# Patient Record
Sex: MALE | Race: BLACK OR AFRICAN AMERICAN | NOT HISPANIC OR LATINO | URBAN - METROPOLITAN AREA
[De-identification: names, ages, dates, MRNs, and addresses within clinical notes are randomized per-mention and may not be internally consistent; named-entity substitution may affect disease eponyms.]

---

## 2017-03-20 ENCOUNTER — HOSPITAL ENCOUNTER (EMERGENCY)
Facility: OTHER | Age: 52
Discharge: HOME | End: 2017-03-20
Attending: EMERGENCY MEDICINE
Payer: COMMERCIAL

## 2017-03-20 VITALS
BODY MASS INDEX: 23.52 KG/M2 | OXYGEN SATURATION: 99 % | RESPIRATION RATE: 16 BRPM | DIASTOLIC BLOOD PRESSURE: 58 MMHG | SYSTOLIC BLOOD PRESSURE: 114 MMHG | TEMPERATURE: 100 F | HEIGHT: 71 IN | WEIGHT: 168 LBS | HEART RATE: 81 BPM

## 2017-03-20 DIAGNOSIS — L50.9 URTICARIA: Primary | ICD-10-CM

## 2017-03-20 PROCEDURE — 99283 EMERGENCY DEPT VISIT LOW MDM: CPT

## 2017-03-20 PROCEDURE — 63600175 PHARM REV CODE 636 W HCPCS: Performed by: PHYSICIAN ASSISTANT

## 2017-03-20 PROCEDURE — 25000003 PHARM REV CODE 250: Performed by: PHYSICIAN ASSISTANT

## 2017-03-20 RX ORDER — FAMOTIDINE 20 MG/1
20 TABLET, FILM COATED ORAL 2 TIMES DAILY PRN
Qty: 20 TABLET | Refills: 0 | Status: SHIPPED | OUTPATIENT
Start: 2017-03-20 | End: 2018-03-20

## 2017-03-20 RX ORDER — DIPHENHYDRAMINE HCL 25 MG
25 CAPSULE ORAL EVERY 6 HOURS PRN
Qty: 20 CAPSULE | Refills: 0 | Status: SHIPPED | OUTPATIENT
Start: 2017-03-20

## 2017-03-20 RX ORDER — PREDNISONE 20 MG/1
60 TABLET ORAL DAILY
Qty: 12 TABLET | Refills: 0 | Status: SHIPPED | OUTPATIENT
Start: 2017-03-20 | End: 2017-03-24

## 2017-03-20 RX ORDER — ASPIRIN 81 MG/1
81 TABLET ORAL DAILY
COMMUNITY

## 2017-03-20 RX ORDER — FAMOTIDINE 20 MG/1
20 TABLET, FILM COATED ORAL
Status: COMPLETED | OUTPATIENT
Start: 2017-03-20 | End: 2017-03-20

## 2017-03-20 RX ORDER — DIPHENHYDRAMINE HCL 25 MG
50 CAPSULE ORAL
Status: COMPLETED | OUTPATIENT
Start: 2017-03-20 | End: 2017-03-20

## 2017-03-20 RX ORDER — PREDNISONE 20 MG/1
60 TABLET ORAL
Status: COMPLETED | OUTPATIENT
Start: 2017-03-20 | End: 2017-03-20

## 2017-03-20 RX ADMIN — DIPHENHYDRAMINE HYDROCHLORIDE 50 MG: 25 CAPSULE ORAL at 11:03

## 2017-03-20 RX ADMIN — FAMOTIDINE 20 MG: 20 TABLET, FILM COATED ORAL at 11:03

## 2017-03-20 RX ADMIN — PREDNISONE 60 MG: 20 TABLET ORAL at 11:03

## 2017-03-20 NOTE — DISCHARGE INSTRUCTIONS
Hives (Adult)  Hives are pink or red bumps on the skin. These bumps are also known as wheals. The bumps can itch, burn, or sting. Hives can occur anywhere on the body. They vary in size and shape and can form in clusters. Individual hives can appear and go away quickly. New hives may develop as old ones fade. Hives are common and usually harmless. Occasionally hives are a sign of a serious allergy.  Hives are often caused by an allergic reaction. It may be an allergic reaction to foods such as fruit, shellfish, chocolate, nuts, or tomatoes. It may be a reaction to pollens, animal fur, or mold spores. Medicines, chemicals, and insect bites can also cause hives. And hives can be caused by hot sun or cold air. The cause of hives can be difficult to find.  You may be given medicines to relieve swelling and itching. Follow all instructions when using these medicines. The hives will usually fade in a few days, but can last up to 2 weeks.  Home care  Follow these tips:  · Try to find the cause of the hives and eliminate it. Discuss possible causes with your healthcare provider. Future reactions to the same allergen may be worse.  · Dont scratch the hives. Scratching will delay healing. To reduce itching, apply cool, wet compresses to the skin.  · Dress in soft, loose cotton clothing.  · Dont bathe in hot water. This can make the itching worse.  · Apply an ice pack or cool pack wrapped in a thin towel to your skin. This will help reduce redness and itching. But if your hives were caused by exposure to cold, then do not apply more cold to them.  · You may use over-the counter antihistamines to reduce itching. Some older antihistamines, such as diphenhydramine and chlorpheniramine, are inexpensive. But they need to be taken often and may make you sleepy. They are best used at bedtime. Dont use diphenhydramine if you have glaucoma or have trouble urinating because of an enlarged prostate. Newer antihistamines, such as  loratadine, cetirizine, and fexofenadine, are generally more expensive. But they tend to have fewer side effects, such as drowsiness. They can be taken less often.  · Another type of antihistamine is used to treat heartburn. This type includes ranitidine, nizatidine, famotidine, and cimetidine. These are sometimes used along with the above antihistamines if a single medicine is not working.  Follow-up care  Follow up with your healthcare provider if your symptoms don't get better in 2 days. Ask your provider about allergy testing if you have had a severe reaction, or have had several episodes of hives. He or she can use the allergy testing to find out what you are allergic to.  When to seek medical advice  Call your healthcare provider right away if any of these occur:  · Fever of 100.4°F (38.0°C) or higher, or as directed by your healthcare provider  · Redness, swelling, or pain  · Foul-smelling fluid coming from the rash  Call 911  Call 911 if any of the following occur:  · Swelling of the face, throat, or tongue  · Trouble breathing or swallowing  · Dizziness, weakness, or fainting  Date Last Reviewed: 9/1/2016  © 4885-9646 [employer]. [address] [place] [address], [place] [address]. All rights reserved. This information is not intended as a substitute for professional medical care. Always follow your healthcare professional's instructions.

## 2017-03-20 NOTE — ED AVS SNAPSHOT
[facility]  [address]  [facility] [address]               Jacky Allison   3/20/2017 10:14 AM   ED    Description:  Male : 1965   Department:  [facility]           Your Care was Coordinated By:     Provider Role From To    Williams Jason MD Attending Provider 17 1017 --    Kathleen Fitzgerald PA-C Physician Assistant 17 1018 --      Reason for Visit     Urticaria           Diagnoses this Visit        Comments    Urticaria    -  Primary       ED Disposition     None           To Do List           Follow-up Information     Follow up with PCP In 2 days.       These Medications        Disp Refills Start End    predniSONE (DELTASONE) 20 MG tablet 12 tablet 0 3/20/2017 3/24/2017    Take 3 tablets (60 mg total) by mouth once daily. Start tomorrow. - Oral    diphenhydrAMINE (BENADRYL) 25 mg capsule 20 capsule 0 3/20/2017     Take 1 each (25 mg total) by mouth every 6 (six) hours as needed for Itching or Allergies. - Oral    famotidine (PEPCID) 20 MG tablet 20 tablet 0 3/20/2017 3/20/2018    Take 1 tablet (20 mg total) by mouth 2 (two) times daily as needed (itching). - Oral      [facility]     [facility] -  Assistance  Registered nurses in the [facility] provide clinical advisement, health education, appointment booking, and other advisory services.  Call for this free service at [phone].             Medications           Message regarding Medications     Verify the changes and/or additions to your medication regime listed below are the same as discussed with your clinician today.  If any of these changes or additions are incorrect, please notify your healthcare provider.        START taking these NEW medications        Refills    predniSONE (DELTASONE) 20 MG tablet 0    Sig: Take 3 tablets (60 mg total) by mouth once daily. Start tomorrow.    Class: Print    Route: Oral     "diphenhydrAMINE (BENADRYL) 25 mg capsule 0    Sig: Take 1 each (25 mg total) by mouth every 6 (six) hours as needed for Itching or Allergies.    Class: Print    Route: Oral    famotidine (PEPCID) 20 MG tablet 0    Sig: Take 1 tablet (20 mg total) by mouth 2 (two) times daily as needed (itching).    Class: Print    Route: Oral      These medications were administered today        Dose Freq    predniSONE tablet 60 mg 60 mg ED 1 Time    Sig: Take 3 tablets (60 mg total) by mouth ED 1 Time.    Class: Normal    Route: Oral    diphenhydrAMINE capsule 50 mg 50 mg ED 1 Time    Sig: Take 2 each (50 mg total) by mouth ED 1 Time.    Class: Normal    Route: Oral    famotidine tablet 20 mg 20 mg ED 1 Time    Sig: Take 1 tablet (20 mg total) by mouth ED 1 Time.    Class: Normal    Route: Oral           Verify that the below list of medications is an accurate representation of the medications you are currently taking.  If none reported, the list may be blank. If incorrect, please contact your healthcare provider. Carry this list with you in case of emergency.           Current Medications     aspirin (ECOTRIN) 81 MG EC tablet Take 81 mg by mouth once daily.    ATORVASTATIN CALCIUM (ATORVASTATIN ORAL) Take by mouth.    diphenhydrAMINE (BENADRYL) 25 mg capsule Take 1 each (25 mg total) by mouth every 6 (six) hours as needed for Itching or Allergies.    diphenhydrAMINE capsule 50 mg Take 2 each (50 mg total) by mouth ED 1 Time.    famotidine (PEPCID) 20 MG tablet Take 1 tablet (20 mg total) by mouth 2 (two) times daily as needed (itching).    famotidine tablet 20 mg Take 1 tablet (20 mg total) by mouth ED 1 Time.    predniSONE (DELTASONE) 20 MG tablet Take 3 tablets (60 mg total) by mouth once daily. Start tomorrow.           Clinical Reference Information           Your Vitals Were     BP Pulse Temp Resp Height Weight    118/65 (BP Location: Left arm, Patient Position: Sitting) 95 99.7 °F (37.6 °C) (Oral) 18 5' 11" (1.803 m) 76.2 kg " (168 lb)    SpO2 BMI             100% 23.43 kg/m2         Allergies as of 3/20/2017     No Known Allergies      Immunizations Administered on Date of Encounter - 3/20/2017     None      ED Micro, Lab, POCT     None      ED Imaging Orders     None        Discharge Instructions         Hives (Adult)  Hives are pink or red bumps on the skin. These bumps are also known as wheals. The bumps can itch, burn, or sting. Hives can occur anywhere on the body. They vary in size and shape and can form in clusters. Individual hives can appear and go away quickly. New hives may develop as old ones fade. Hives are common and usually harmless. Occasionally hives are a sign of a serious allergy.  Hives are often caused by an allergic reaction. It may be an allergic reaction to foods such as fruit, shellfish, chocolate, nuts, or tomatoes. It may be a reaction to pollens, animal fur, or mold spores. Medicines, chemicals, and insect bites can also cause hives. And hives can be caused by hot sun or cold air. The cause of hives can be difficult to find.  You may be given medicines to relieve swelling and itching. Follow all instructions when using these medicines. The hives will usually fade in a few days, but can last up to 2 weeks.  Home care  Follow these tips:  · Try to find the cause of the hives and eliminate it. Discuss possible causes with your healthcare provider. Future reactions to the same allergen may be worse.  · Dont scratch the hives. Scratching will delay healing. To reduce itching, apply cool, wet compresses to the skin.  · Dress in soft, loose cotton clothing.  · Dont bathe in hot water. This can make the itching worse.  · Apply an ice pack or cool pack wrapped in a thin towel to your skin. This will help reduce redness and itching. But if your hives were caused by exposure to cold, then do not apply more cold to them.  · You may use over-the counter antihistamines to reduce itching. Some older antihistamines, such as  diphenhydramine and chlorpheniramine, are inexpensive. But they need to be taken often and may make you sleepy. They are best used at bedtime. Dont use diphenhydramine if you have glaucoma or have trouble urinating because of an enlarged prostate. Newer antihistamines, such as loratadine, cetirizine, and fexofenadine, are generally more expensive. But they tend to have fewer side effects, such as drowsiness. They can be taken less often.  · Another type of antihistamine is used to treat heartburn. This type includes ranitidine, nizatidine, famotidine, and cimetidine. These are sometimes used along with the above antihistamines if a single medicine is not working.  Follow-up care  Follow up with your healthcare provider if your symptoms don't get better in 2 days. Ask your provider about allergy testing if you have had a severe reaction, or have had several episodes of hives. He or she can use the allergy testing to find out what you are allergic to.  When to seek medical advice  Call your healthcare provider right away if any of these occur:  · Fever of 100.4°F (38.0°C) or higher, or as directed by your healthcare provider  · Redness, swelling, or pain  · Foul-smelling fluid coming from the rash  Call 911  Call 911 if any of the following occur:  · Swelling of the face, throat, or tongue  · Trouble breathing or swallowing  · Dizziness, weakness, or fainting  Date Last Reviewed: 9/1/2016  © 2445-4965 [employer]. [address] [place] [address], [place] [address]. All rights reserved. This information is not intended as a substitute for professional medical care. Always follow your healthcare professional's instructions.          [facility] Sign-Up     Activating your [facility] account is as easy as 1-2-3!     1) Visit [email or web address], select Sign Up Now, enter this activation code and your date of birth, then select Next.  [email or web address]  Expires: 5/4/2017 11:34 AM      2) Create a username and password to  use when you visit [facility] in the future and select a security question in case you lose your password and select Next.    3) Enter your e-mail address and click Sign Up!    Additional Information  If you have questions, please e-mail [email or web address] or call [phone] to talk to our [facility] staff. Remember, [facility] is NOT to be used for urgent needs. For medical emergencies, dial 911.         Smoking Cessation     If you would like to quit smoking:   You may be eligible for free services if you are a [place] resident and started smoking cigarettes before September 1, 1988.  Call the Smoking Cessation Trust ([facility]) toll free at (187) 604-1999 or (368) 497-7502.   Call 3-849-QUIT-NOW if you do not meet the above criteria.             [facility] complies with applicable Federal civil rights laws and does not discriminate on the basis of race, color, national origin, age, disability, or sex.        Language Assistance Services     ATTENTION: Language assistance services are available, free of charge. Please call [phone].      ATENCIÓN: Si habla español, tiene a hobson disposición servicios gratuitos de asistencia lingüística. Llame al [phone].     CHÚ Ý: N?u b?n nói Ti?ng Vi?t, có các d?ch v? h? tr? ngôn ng? mi?n phí dành cho b?n. G?i s? [phone].

## 2017-03-20 NOTE — ED PROVIDER NOTES
Encounter Date: 3/20/2017       History     Chief Complaint   Patient presents with    Urticaria     pt states he has generalized hives for several days.      Review of patient's allergies indicates:  No Known Allergies  HPI Comments: Patient is a 51-year-old male with history of hyperlipidemia and hypertension who presents the emergency department with rash to body.  Patient states on Friday he noticed hives to his legs and trunk.  Patient states the rash has spread.  Patient reports extreme pruritis. Patient denies any recent illness, fevers, or chills.  Patient denies any recent change in medications.  Patient denies change in soaps, laundry detergents, lotions, or other body products.  Patient denies any allergic systemic symptoms.  Denies oral cavity swelling, shortness of breath, wheezing, nausea, vomiting.    The history is provided by the patient.     Past Medical History:   Diagnosis Date    Hyperlipidemia     Hypertension     Stroke      Past Surgical History:   Procedure Laterality Date    CEREBRAL EMBOLIZATION       History reviewed. No pertinent family history.  Social History   Substance Use Topics    Smoking status: Former Smoker    Smokeless tobacco: None    Alcohol use No     Review of Systems   Constitutional: Negative for activity change, appetite change, chills, fatigue and fever.   HENT: Negative for congestion, ear discharge, ear pain, hearing loss, mouth sores, postnasal drip, rhinorrhea, sore throat and trouble swallowing.    Respiratory: Negative for cough and shortness of breath.    Cardiovascular: Negative for chest pain.   Gastrointestinal: Negative for abdominal pain, blood in stool, constipation, diarrhea, nausea and vomiting.   Genitourinary: Negative for difficulty urinating, dysuria, flank pain and frequency.   Musculoskeletal: Negative for back pain, neck pain and neck stiffness.   Skin: Positive for rash. Negative for wound.   Neurological: Negative for dizziness, syncope,  speech difficulty, weakness, light-headedness, numbness and headaches.       Physical Exam   Initial Vitals   BP Pulse Resp Temp SpO2   03/20/17 0931 03/20/17 0931 03/20/17 0931 03/20/17 0931 03/20/17 0931   118/65 95 18 99.7 °F (37.6 °C) 100 %     Physical Exam    Nursing note and vitals reviewed.  Constitutional: He appears well-developed and well-nourished. He is not diaphoretic.  Non-toxic appearance. No distress.   HENT:   Head: Normocephalic.   Right Ear: Hearing, tympanic membrane, external ear and ear canal normal.   Left Ear: Hearing, tympanic membrane, external ear and ear canal normal.   Nose: Nose normal.   Mouth/Throat: Uvula is midline, oropharynx is clear and moist and mucous membranes are normal. No trismus in the jaw. No uvula swelling. No oropharyngeal exudate.   Eyes: Conjunctivae are normal. Pupils are equal, round, and reactive to light.   Neck: Normal range of motion.   Cardiovascular: Normal rate and regular rhythm.   Pulmonary/Chest: Breath sounds normal. No respiratory distress. He has no wheezes. He has no rhonchi. He has no rales. He exhibits no tenderness.   Abdominal: Soft. Bowel sounds are normal. He exhibits no distension and no mass. There is no tenderness. There is no rebound and no guarding.   Lymphadenopathy:     He has no cervical adenopathy.   Neurological: He is alert and oriented to person, place, and time.   Skin: Skin is warm and dry.   Diffuse erythematous papules and urticarial lesions to trunk and extremities.   Psychiatric: He has a normal mood and affect.         ED Course   Procedures  Labs Reviewed - No data to display          Medical Decision Making:   Initial Assessment:   Urgent evaluation of a 51-year-old male with history of hypertension and hyperlipidemia who presents to the emergency department with rash.  Patient is afebrile, nontoxic appearing, and hemodynamically stable.  On exam, there are diffuse erythematous papules and urticarial lesions to trunk and  extremities.  No oral cavity swelling.  Lungs are clear to auscultation.  Patient has no other systemic symptoms.  No recent changes in medications.  No recent travel.  No recent illnesses.  No recent changes and soaps, laundry detergents, or other body products.  I am unsure of etiology of hives.  He'll be given burst of steroids, Benadryl, and Pepcid.  He is advised to follow-up with PCP in 24-48 hours for reevaluation.  He is advised to return to the emergency department with any worsening symptoms or concerns.  Other:   I have discussed this case with another health care provider.       <> Summary of the Discussion: Dr. Jason                   ED Course     Clinical Impression:   The encounter diagnosis was Urticaria.          Kathleen Fitzgerald PA-C  03/20/17 1133

## 2017-03-20 NOTE — ED TRIAGE NOTES
Pt reports to ED c/o hives with itching to circumferential torso and upper legs x 2 days; denies any recent change in detergent, lotion, or medications. Denies chest pain, SOB, fevers, chills, N/V/D, difficulty breathing or swallowing.

## 2025-03-19 ENCOUNTER — HOSPITAL ENCOUNTER (OUTPATIENT)
Facility: OTHER | Age: 60
Discharge: HOME | End: 2025-03-21
Attending: INTERNAL MEDICINE | Admitting: HOSPITALIST
Payer: COMMERCIAL

## 2025-03-19 DIAGNOSIS — N18.6 END STAGE RENAL DISEASE: ICD-10-CM

## 2025-03-19 DIAGNOSIS — N18.6 ESRD ON DIALYSIS: Primary | ICD-10-CM

## 2025-03-19 DIAGNOSIS — R07.9 CHEST PAIN: ICD-10-CM

## 2025-03-19 DIAGNOSIS — Z99.2 ESRD ON DIALYSIS: Primary | ICD-10-CM

## 2025-03-19 PROBLEM — E78.5 HYPERLIPIDEMIA: Status: ACTIVE | Noted: 2025-02-12

## 2025-03-19 PROBLEM — I10 HYPERTENSION: Status: ACTIVE | Noted: 2025-02-12

## 2025-03-19 LAB
ALBUMIN SERPL BCP-MCNC: 3 G/DL (ref 3.5–5.2)
ANION GAP SERPL CALC-SCNC: 19 MMOL/L (ref 8–16)
BASOPHILS # BLD AUTO: 0.03 K/UL (ref 0–0.2)
BASOPHILS NFR BLD: 0.4 % (ref 0–1.9)
BUN SERPL-MCNC: 108 MG/DL (ref 6–20)
CALCIUM SERPL-MCNC: 7.8 MG/DL (ref 8.7–10.5)
CHLORIDE SERPL-SCNC: 105 MMOL/L (ref 95–110)
CO2 SERPL-SCNC: 13 MMOL/L (ref 23–29)
CREAT SERPL-MCNC: 21 MG/DL (ref 0.5–1.4)
DIFFERENTIAL METHOD BLD: ABNORMAL
EOSINOPHIL # BLD AUTO: 0.3 K/UL (ref 0–0.5)
EOSINOPHIL NFR BLD: 4 % (ref 0–8)
ERYTHROCYTE [DISTWIDTH] IN BLOOD BY AUTOMATED COUNT: 16.8 % (ref 11.5–14.5)
EST. GFR  (NO RACE VARIABLE): 2 ML/MIN/1.73 M^2
GLUCOSE SERPL-MCNC: 166 MG/DL (ref 70–110)
HCT VFR BLD AUTO: 28.3 % (ref 40–54)
HGB BLD-MCNC: 9 G/DL (ref 14–18)
IMM GRANULOCYTES # BLD AUTO: 0.03 K/UL (ref 0–0.04)
IMM GRANULOCYTES NFR BLD AUTO: 0.4 % (ref 0–0.5)
LYMPHOCYTES # BLD AUTO: 1.2 K/UL (ref 1–4.8)
LYMPHOCYTES NFR BLD: 16.3 % (ref 18–48)
MCH RBC QN AUTO: 26.9 PG (ref 27–31)
MCHC RBC AUTO-ENTMCNC: 31.8 G/DL (ref 32–36)
MCV RBC AUTO: 85 FL (ref 82–98)
MONOCYTES # BLD AUTO: 0.6 K/UL (ref 0.3–1)
MONOCYTES NFR BLD: 8.3 % (ref 4–15)
NEUTROPHILS # BLD AUTO: 5.1 K/UL (ref 1.8–7.7)
NEUTROPHILS NFR BLD: 70.6 % (ref 38–73)
NRBC BLD-RTO: 0 /100 WBC
PHOSPHATE SERPL-MCNC: 10.7 MG/DL (ref 2.7–4.5)
PLATELET # BLD AUTO: 106 K/UL (ref 150–450)
PMV BLD AUTO: 9.9 FL (ref 9.2–12.9)
POTASSIUM SERPL-SCNC: 5.2 MMOL/L (ref 3.5–5.1)
RBC # BLD AUTO: 3.35 M/UL (ref 4.6–6.2)
SODIUM SERPL-SCNC: 137 MMOL/L (ref 136–145)
WBC # BLD AUTO: 7.24 K/UL (ref 3.9–12.7)

## 2025-03-19 PROCEDURE — 36558 INSERT TUNNELED CV CATH: CPT | Mod: RT | Performed by: INTERNAL MEDICINE

## 2025-03-19 PROCEDURE — C1894 INTRO/SHEATH, NON-LASER: HCPCS | Performed by: INTERNAL MEDICINE

## 2025-03-19 PROCEDURE — C1769 GUIDE WIRE: HCPCS | Performed by: INTERNAL MEDICINE

## 2025-03-19 PROCEDURE — 63600175 PHARM REV CODE 636 W HCPCS: Mod: TB | Performed by: INTERNAL MEDICINE

## 2025-03-19 PROCEDURE — C1750 CATH, HEMODIALYSIS,LONG-TERM: HCPCS | Performed by: INTERNAL MEDICINE

## 2025-03-19 PROCEDURE — G0378 HOSPITAL OBSERVATION PER HR: HCPCS

## 2025-03-19 PROCEDURE — 80069 RENAL FUNCTION PANEL: CPT | Performed by: INTERNAL MEDICINE

## 2025-03-19 PROCEDURE — 25000003 PHARM REV CODE 250: Performed by: INTERNAL MEDICINE

## 2025-03-19 PROCEDURE — 85025 COMPLETE CBC W/AUTO DIFF WBC: CPT | Performed by: INTERNAL MEDICINE

## 2025-03-19 PROCEDURE — C1751 CATH, INF, PER/CENT/MIDLINE: HCPCS

## 2025-03-19 DEVICE — PRECISION CHRONIC CATHETER KIT,SYMMETRICAL TIP, TAL VENATRAC STYLET,14.5 FR/CH (4.8 MM) X 19 CM
Type: IMPLANTABLE DEVICE | Site: CHEST  WALL | Status: FUNCTIONAL
Brand: PALINDROME

## 2025-03-19 RX ORDER — NIFEDIPINE 30 MG/1
60 TABLET, EXTENDED RELEASE ORAL ONCE
Status: COMPLETED | OUTPATIENT
Start: 2025-03-19 | End: 2025-03-19

## 2025-03-19 RX ORDER — ACETAMINOPHEN 325 MG/1
650 TABLET ORAL EVERY 8 HOURS PRN
Status: DISCONTINUED | OUTPATIENT
Start: 2025-03-19 | End: 2025-03-21 | Stop reason: HOSPADM

## 2025-03-19 RX ORDER — CALCITRIOL 0.25 UG/1
0.5 CAPSULE ORAL DAILY
Status: DISCONTINUED | OUTPATIENT
Start: 2025-03-20 | End: 2025-03-21 | Stop reason: HOSPADM

## 2025-03-19 RX ORDER — HEPARIN SOD,PORCINE/0.9 % NACL 1000/500ML
INTRAVENOUS SOLUTION INTRAVENOUS
Status: DISCONTINUED | OUTPATIENT
Start: 2025-03-19 | End: 2025-03-19

## 2025-03-19 RX ORDER — HEPARIN SODIUM 1000 [USP'U]/ML
INJECTION, SOLUTION INTRAVENOUS; SUBCUTANEOUS
Status: DISCONTINUED | OUTPATIENT
Start: 2025-03-19 | End: 2025-03-19

## 2025-03-19 RX ORDER — CALCIUM CARBONATE 500(1250)
1250 TABLET ORAL
COMMUNITY
Start: 2025-02-20 | End: 2026-02-20

## 2025-03-19 RX ORDER — ONDANSETRON HYDROCHLORIDE 2 MG/ML
4 INJECTION, SOLUTION INTRAVENOUS EVERY 6 HOURS PRN
Status: DISCONTINUED | OUTPATIENT
Start: 2025-03-19 | End: 2025-03-21 | Stop reason: HOSPADM

## 2025-03-19 RX ORDER — SODIUM CHLORIDE 0.9 % (FLUSH) 0.9 %
10 SYRINGE (ML) INJECTION EVERY 8 HOURS PRN
Status: DISCONTINUED | OUTPATIENT
Start: 2025-03-19 | End: 2025-03-21 | Stop reason: HOSPADM

## 2025-03-19 RX ORDER — SODIUM CHLORIDE 0.9 % (FLUSH) 0.9 %
10 SYRINGE (ML) INJECTION EVERY 8 HOURS PRN
Status: DISCONTINUED | OUTPATIENT
Start: 2025-03-19 | End: 2025-03-19

## 2025-03-19 RX ORDER — NIFEDIPINE 30 MG/1
60 TABLET, EXTENDED RELEASE ORAL DAILY
Status: DISCONTINUED | OUTPATIENT
Start: 2025-03-20 | End: 2025-03-21 | Stop reason: HOSPADM

## 2025-03-19 RX ORDER — AMOXICILLIN 250 MG
1 CAPSULE ORAL 2 TIMES DAILY
Status: DISCONTINUED | OUTPATIENT
Start: 2025-03-19 | End: 2025-03-19

## 2025-03-19 RX ORDER — NIFEDIPINE 60 MG/1
1 TABLET, EXTENDED RELEASE ORAL DAILY
COMMUNITY
Start: 2025-02-21 | End: 2026-02-21

## 2025-03-19 RX ORDER — NALOXONE HCL 0.4 MG/ML
0.02 VIAL (ML) INJECTION
Status: DISCONTINUED | OUTPATIENT
Start: 2025-03-19 | End: 2025-03-21 | Stop reason: HOSPADM

## 2025-03-19 RX ORDER — TALC
6 POWDER (GRAM) TOPICAL NIGHTLY PRN
Status: DISCONTINUED | OUTPATIENT
Start: 2025-03-19 | End: 2025-03-21 | Stop reason: HOSPADM

## 2025-03-19 RX ORDER — FAMOTIDINE 20 MG/1
20 TABLET, FILM COATED ORAL
Status: DISCONTINUED | OUTPATIENT
Start: 2025-03-19 | End: 2025-03-21 | Stop reason: HOSPADM

## 2025-03-19 RX ORDER — DESMOPRESSIN ACETATE 4 UG/ML
2 INJECTION, SOLUTION INTRAVENOUS; SUBCUTANEOUS ONCE
Status: COMPLETED | OUTPATIENT
Start: 2025-03-19 | End: 2025-03-19

## 2025-03-19 RX ORDER — SEVELAMER CARBONATE 800 MG/1
1600 TABLET, FILM COATED ORAL
COMMUNITY
Start: 2025-02-20 | End: 2026-02-20

## 2025-03-19 RX ORDER — TALC
6 POWDER (GRAM) TOPICAL NIGHTLY PRN
Status: DISCONTINUED | OUTPATIENT
Start: 2025-03-19 | End: 2025-03-19

## 2025-03-19 RX ORDER — ATORVASTATIN CALCIUM 20 MG/1
80 TABLET, FILM COATED ORAL DAILY
Status: DISCONTINUED | OUTPATIENT
Start: 2025-03-20 | End: 2025-03-21 | Stop reason: HOSPADM

## 2025-03-19 RX ORDER — SEVELAMER CARBONATE 800 MG/1
1600 TABLET, FILM COATED ORAL
Status: DISCONTINUED | OUTPATIENT
Start: 2025-03-20 | End: 2025-03-21 | Stop reason: HOSPADM

## 2025-03-19 RX ORDER — LIDOCAINE HYDROCHLORIDE 20 MG/ML
INJECTION, SOLUTION EPIDURAL; INFILTRATION; INTRACAUDAL; PERINEURAL
Status: DISCONTINUED | OUTPATIENT
Start: 2025-03-19 | End: 2025-03-19

## 2025-03-19 RX ORDER — CALCITRIOL 0.5 UG/1
1 CAPSULE ORAL DAILY
COMMUNITY
Start: 2025-02-21 | End: 2026-02-21

## 2025-03-19 RX ORDER — LABETALOL 200 MG/1
200 TABLET, FILM COATED ORAL ONCE
Status: COMPLETED | OUTPATIENT
Start: 2025-03-19 | End: 2025-03-19

## 2025-03-19 RX ORDER — CALCIUM CARBONATE 200(500)MG
1000 TABLET,CHEWABLE ORAL DAILY
Status: DISCONTINUED | OUTPATIENT
Start: 2025-03-20 | End: 2025-03-21 | Stop reason: HOSPADM

## 2025-03-19 RX ADMIN — LABETALOL HYDROCHLORIDE 200 MG: 200 TABLET, FILM COATED ORAL at 11:03

## 2025-03-19 RX ADMIN — DESMOPRESSIN ACETATE 2 MCG: 4 SOLUTION INTRAVENOUS at 02:03

## 2025-03-19 RX ADMIN — DESMOPRESSIN ACETATE 26.32 MCG: 4 SOLUTION INTRAVENOUS at 05:03

## 2025-03-19 RX ADMIN — NIFEDIPINE 60 MG: 30 TABLET, FILM COATED, EXTENDED RELEASE ORAL at 11:03

## 2025-03-19 NOTE — H&P
[facility] - Cath Lab  History & Physical    Subjective:      Chief Complaint/Reason for Admission: Here for tunneled dialysis catheter placement.    Jacky Allison is a 59 y.o. male with ESRD (now transitioning to hemodialysis at [facility] under my care) who presents today for tunneled dialysis catheter placement because he no longer wishes to do PD.    Past Medical History:   Diagnosis Date    ESRD (end stage renal disease) on dialysis     Hyperlipidemia     Hypertension     Stroke      Past Surgical History:   Procedure Laterality Date    CEREBRAL EMBOLIZATION       Social History[1]    PTA Medications   Medication Sig    aspirin (ECOTRIN) 81 MG EC tablet Take 81 mg by mouth once daily.    ATORVASTATIN CALCIUM (ATORVASTATIN ORAL) Take by mouth.    calcitRIOL (ROCALTROL) 0.5 MCG Cap Take 1 capsule by mouth once daily.    calcium carbonate (OS-ANUJ) 500 mg calcium (1,250 mg) tablet Take 1,250 mg by mouth.    NIFEdipine (PROCARDIA-XL) 60 MG (OSM) 24 hr tablet Take 1 tablet by mouth once daily.    sevelamer carbonate (RENVELA) 800 mg Tab Take 1,600 mg by mouth.    diphenhydrAMINE (BENADRYL) 25 mg capsule Take 1 each (25 mg total) by mouth every 6 (six) hours as needed for Itching or Allergies.    famotidine (PEPCID) 20 MG tablet Take 1 tablet (20 mg total) by mouth 2 (two) times daily as needed (itching).     Review of patient's allergies indicates:  No Known Allergies     Review of Systems   Constitutional: Negative.    Respiratory: Negative.     Cardiovascular: Negative.        Objective:      Vital Signs (Most Recent)  Temp: 98.1 °F (36.7 °C) (03/19/25 1056)  Pulse: 79 (03/19/25 1056)  Resp: 18 (03/19/25 1056)  BP: (!) 177/105 (03/19/25 1212)  SpO2: 100 % (03/19/25 1056)    Vital Signs Range (Last 24H):  Temp:  [98.1 °F (36.7 °C)]   Pulse:  [79]   Resp:  [18]   BP: (177-202)/()   SpO2:  [100 %]     Physical Exam  Constitutional:       General: He is not in acute distress.     Appearance: He is  well-developed. He is not diaphoretic.   HENT:      Head: Normocephalic and atraumatic.   Cardiovascular:      Comments: R IJ open with good evidence of doppler flow  Pulmonary:      Effort: Pulmonary effort is normal. No respiratory distress.   Musculoskeletal:      Cervical back: Neck supple.   Skin:     General: Skin is warm and dry.   Neurological:      Mental Status: He is alert and oriented to person, place, and time.   Psychiatric:         Behavior: Behavior normal.       Assessment:      There are no hospital problems to display for this patient.      Plan:      TDC placement today for hemodialysis.  Risks explained, consent signed.           [1]   Social History  Tobacco Use    Smoking status: Former   Substance Use Topics    Alcohol use: No

## 2025-03-19 NOTE — PLAN OF CARE
Patient brought to holding area. Catheter insertion site noted to be bleeding. Dressing removed and pressure held by RN. MD notified.

## 2025-03-19 NOTE — PLAN OF CARE
Dr. Noguera notified that patient's insertion site is still actively bleeding. MD stated to re-apply pressure to site and administer another dose of DDAVP. Will continue to monitor.

## 2025-03-19 NOTE — PLAN OF CARE
Assessed patient's insertion site. Both dressings saturated and actively bleeding. Manual pressure held for 30 minutes until hemostasis was met. Attempted to contact Dr. Noguera 4 times without any success. House supervisor notified of current situation. While pressure was being held, JENNIFER Le NP arrived in CATH Lab to assess the patient. Both dressings changed and incision site assessed by JENNIFER Le NP. She agreed that hemostasis was met and informed the patient that she will continue to monitor his situation.

## 2025-03-19 NOTE — BRIEF OP NOTE
[facility] - Cath Lab  Brief Operative Note    Surgery Date: 3/19/2025     Surgeons and Role:     * Glenn Noguera MD - Primary    Assisting Surgeon: None    Pre-op Diagnosis:  End stage renal disease [N18.6]    Post-op Diagnosis:  Post-Op Diagnosis Codes:     * End stage renal disease [N18.6]    Procedure(s) (LRB):  Insertion, Catheter, Central Venous, Hemodialysis (N/A)    Anesthesia: 1% lidocaine    Operative Findings: Patient was prepped and draped in a sterile fashion.  This was a successful placement of a R internal jugular vein tunneled dialysis catheter.  Patient tolerated the procedure well and no immediate complications noted.  There was persistent oozing from the venotomy site and his exit site.  He was given a dose of ddavp with some improvement.  He required over 2 hours of compression to stop the oozing.      Estimated Blood Loss: 20 mL         Specimens:   Specimen (24h ago, onward)      None            * No specimens in log *        Discharge Note    OUTCOME: Patient was prepped and draped in a sterile fashion.  This was a successful placement of a R internal jugular vein tunneled dialysis catheter.  Patient tolerated the procedure well and no immediate complications noted.  There was persistent oozing from the venotomy site and his exit site.  He was given a dose of ddavp with some improvement.  He required over 2 hours of compression to stop the oozing.      DISPOSITION: Home or Self Care    FINAL DIAGNOSIS:  <principal problem not specified>    FOLLOWUP: In clinic as needed    DISCHARGE INSTRUCTIONS:    Discharge Procedure Orders   Lifting restrictions   Order Comments: No heavy lifting for 24 hours.     Call MD for:  temperature >100.4     Call MD for:  severe uncontrolled pain     Call MD for:  redness, tenderness, or signs of infection (pain, swelling, redness, odor or green/yellow discharge around incision site)     Call MD for:   Order Comments: Bleeding from the access site.     Activity as  tolerated

## 2025-03-19 NOTE — PROGRESS NOTES
03/19/25 1055 03/19/25 1056   Vital Signs   BP (!) 202/98 (!) 199/111   MAP (mmHg) 142 147   BP Location Left arm Right arm      aware. See new orders. Pt denies SOB, headache, chest pain and visual disturbances at this time.

## 2025-03-19 NOTE — PLAN OF CARE
MD at bedside reassessing insertion site. Small amount of bleeding continued to be noted. MD discussing admission to the hospital with the patient to monitor the site overnight. Patient understands and agrees. Will consult hospitalist for admit orders.

## 2025-03-19 NOTE — PROCEDURES
[facility]     Date of Procedure:  03/19/2025 4:02 PM    Procedure: Tunneled Dialysis Central Catheter Insertion     Indication: ESRD requiring RRT, no longer wishing to do PD     Primary Surgeon: Glenn Noguera MD   Assist: none    Referring Provider: Herberth Snowden     Blood Loss: 20 mL    Procedure in Detail:   Patient was prepped and draped in usual sterile fashion. 1% lidocaine was used for local sedation to anesthetize soft tissues. Ultrasound was used to localize the right internal jugular vein; using ultrasound guidance the right IJ was cannulated. Micropuncture wire passed easily into vein and confirmed in correct location under flouroscopy.  Microsheath passed over micropuncture wire, microwire removed, and glidewire passed into the IVC. This was done under fluoroscopic guidance. Using sequential dilator under flouroscopy, the venotomy site was dilated, then a breakaway sheath was inserted into the vein and was stabilized there while we created the tunnel.  The tunnel tract was then created; mapped in location in the right chest wall; injected prior with 1% lidocaine; then a 19 cm catheter was advanced through the tunnel with a hafsa, passed through the venotomy, and then was advanced into the breakaway sheath using the glidewire.  The breakaway sheath was then broken as the catheter was further advanced through the sheath.  This was done under fluoroscopy.  Both ports flushed and aspirated well.  The catheter was packed with 1000 units/mL heparin. Vicryl suture was used to close the venotomy site, and a #2 ethilon suture was used to secure the catheter at the exit site and to anchor to the patient's skin.     A total of 4 sutures were placed.    Patient tolerated the procedure well. He had significant oozing, requiring over 2 hours of compression (likely related to uremic platelets), so a dose of ddavp was given SQ, surgicell was used without success, dermabond was used and  finally an additional suture was used at the venotomy site.  After two hours the oozing had stopped and the patient was discharged home.  He was given instructions to go to the ER if he starts oozing again and he holds compression and is unable to stop the bleed.      The patient continues to ooze from the venotomy and exit site, so we will plan to admit him overnight to observe for bleeding and then dialyze him in the morning prior to discharge.      Glenn Noguera MD  [phone]

## 2025-03-19 NOTE — PLAN OF CARE
MD at bedside evaluating insertion site. Pressure removed and a small amount of new drainage was noted. MD stated that he will re-evaluate drainage in about 5 minutes to see if it has stopped.

## 2025-03-19 NOTE — Clinical Note
MD notified site still bleeding. MD notified there is no CBC on this patient in epic. Orders to continue to hold pressure.

## 2025-03-20 PROBLEM — D62 ACUTE BLOOD LOSS ANEMIA: Status: ACTIVE | Noted: 2025-03-20

## 2025-03-20 LAB
ALBUMIN SERPL BCP-MCNC: 2.7 G/DL (ref 3.5–5.2)
ALP SERPL-CCNC: 57 U/L (ref 40–150)
ALT SERPL W/O P-5'-P-CCNC: 19 U/L (ref 10–44)
ANION GAP SERPL CALC-SCNC: 16 MMOL/L (ref 8–16)
AST SERPL-CCNC: 17 U/L (ref 10–40)
BASOPHILS # BLD AUTO: 0.03 K/UL (ref 0–0.2)
BASOPHILS NFR BLD: 0.5 % (ref 0–1.9)
BILIRUB SERPL-MCNC: 0.4 MG/DL (ref 0.1–1)
BUN SERPL-MCNC: 116 MG/DL (ref 6–20)
CALCIUM SERPL-MCNC: 7.4 MG/DL (ref 8.7–10.5)
CHLORIDE SERPL-SCNC: 108 MMOL/L (ref 95–110)
CO2 SERPL-SCNC: 16 MMOL/L (ref 23–29)
CREAT SERPL-MCNC: 21.7 MG/DL (ref 0.5–1.4)
DIFFERENTIAL METHOD BLD: ABNORMAL
EOSINOPHIL # BLD AUTO: 0.2 K/UL (ref 0–0.5)
EOSINOPHIL NFR BLD: 3.9 % (ref 0–8)
ERYTHROCYTE [DISTWIDTH] IN BLOOD BY AUTOMATED COUNT: 16.5 % (ref 11.5–14.5)
EST. GFR  (NO RACE VARIABLE): 2 ML/MIN/1.73 M^2
GLUCOSE SERPL-MCNC: 128 MG/DL (ref 70–110)
HCT VFR BLD AUTO: 24.4 % (ref 40–54)
HGB BLD-MCNC: 7.6 G/DL (ref 14–18)
IMM GRANULOCYTES # BLD AUTO: 0.03 K/UL (ref 0–0.04)
IMM GRANULOCYTES NFR BLD AUTO: 0.5 % (ref 0–0.5)
LYMPHOCYTES # BLD AUTO: 1.1 K/UL (ref 1–4.8)
LYMPHOCYTES NFR BLD: 17 % (ref 18–48)
MCH RBC QN AUTO: 26.3 PG (ref 27–31)
MCHC RBC AUTO-ENTMCNC: 31.1 G/DL (ref 32–36)
MCV RBC AUTO: 84 FL (ref 82–98)
MONOCYTES # BLD AUTO: 0.6 K/UL (ref 0.3–1)
MONOCYTES NFR BLD: 10.4 % (ref 4–15)
NEUTROPHILS # BLD AUTO: 4.2 K/UL (ref 1.8–7.7)
NEUTROPHILS NFR BLD: 67.7 % (ref 38–73)
NRBC BLD-RTO: 0 /100 WBC
PLATELET # BLD AUTO: 85 K/UL (ref 150–450)
PMV BLD AUTO: 9.4 FL (ref 9.2–12.9)
POTASSIUM SERPL-SCNC: 5.8 MMOL/L (ref 3.5–5.1)
PROT SERPL-MCNC: 6.4 G/DL (ref 6–8.4)
RBC # BLD AUTO: 2.89 M/UL (ref 4.6–6.2)
SODIUM SERPL-SCNC: 140 MMOL/L (ref 136–145)
WBC # BLD AUTO: 6.17 K/UL (ref 3.9–12.7)

## 2025-03-20 PROCEDURE — 25000003 PHARM REV CODE 250: Performed by: NURSE PRACTITIONER

## 2025-03-20 PROCEDURE — 36415 COLL VENOUS BLD VENIPUNCTURE: CPT | Performed by: NURSE PRACTITIONER

## 2025-03-20 PROCEDURE — G0378 HOSPITAL OBSERVATION PER HR: HCPCS

## 2025-03-20 PROCEDURE — C1751 CATH, INF, PER/CENT/MIDLINE: HCPCS

## 2025-03-20 PROCEDURE — 85025 COMPLETE CBC W/AUTO DIFF WBC: CPT | Performed by: NURSE PRACTITIONER

## 2025-03-20 PROCEDURE — 80053 COMPREHEN METABOLIC PANEL: CPT | Performed by: NURSE PRACTITIONER

## 2025-03-20 RX ORDER — ASPIRIN 81 MG/1
81 TABLET ORAL DAILY
Start: 2025-03-22

## 2025-03-20 RX ORDER — SODIUM CHLORIDE 9 MG/ML
INJECTION, SOLUTION INTRAVENOUS ONCE
Status: CANCELLED | OUTPATIENT
Start: 2025-03-20 | End: 2025-03-20

## 2025-03-20 RX ADMIN — NIFEDIPINE 60 MG: 30 TABLET, FILM COATED, EXTENDED RELEASE ORAL at 08:03

## 2025-03-20 RX ADMIN — ATORVASTATIN CALCIUM 80 MG: 20 TABLET, FILM COATED ORAL at 08:03

## 2025-03-20 RX ADMIN — SEVELAMER CARBONATE 1600 MG: 800 TABLET, FILM COATED ORAL at 12:03

## 2025-03-20 RX ADMIN — SEVELAMER CARBONATE 1600 MG: 800 TABLET, FILM COATED ORAL at 08:03

## 2025-03-20 RX ADMIN — CALCIUM CARBONATE 1000 MG: 500 TABLET, CHEWABLE ORAL at 08:03

## 2025-03-20 RX ADMIN — CALCITRIOL CAPSULES 0.25 MCG 0.5 MCG: 0.25 CAPSULE ORAL at 08:03

## 2025-03-20 NOTE — ASSESSMENT & PLAN NOTE
Patient with ESRD who has been on PD and recently decided to switch to ESRD.  Tunneled catheter placed today 3/19/25, with Dr Noguera and developed significant oozing at insertion site.  Bleeding became controlled after 2 doses of desmopressin.    -nephrology consulted with plan to dialyze in the morning  -monitor CBC and consider transfusion for hemoglobin less than 7  -monitor renal function

## 2025-03-20 NOTE — PLAN OF CARE
critical lab for phosphorus reported and charted by cath lab nurse, plan is for patient to receive dialysis to resolve phosphorus level.     Problem: Adult Inpatient Plan of Care  Goal: Plan of Care Review  Outcome: Progressing  Flowsheets (Taken 3/20/2025 0500)  Plan of Care Reviewed With: patient  Goal: Optimal Comfort and Wellbeing  Outcome: Progressing  Intervention: Monitor Pain and Promote Comfort  Flowsheets (Taken 3/20/2025 0500)  Pain Management Interventions:   relaxation techniques promoted   quiet environment facilitated   pain management plan reviewed with patient/caregiver  Goal: Readiness for Transition of Care  Outcome: Progressing

## 2025-03-20 NOTE — ASSESSMENT & PLAN NOTE
Anemia is likely due to acute blood loss which was from HD cath placement. Most recent hemoglobin and hematocrit are listed below.  Recent Labs     03/19/25  1646 03/20/25  0504   HGB 9.0* 7.6*   HCT 28.3* 24.4*     - received desmopressin x2 and bleeding slowed down.   - Nephrology to give a dose of Epo with HD today and then nephrology notes he may go home.     Plan  - Monitor serial CBC: Daily  - Transfuse PRBC if patient becomes hemodynamically unstable, symptomatic or H/H drops below 7/21.  - Patient has not received any PRBC transfusions to date  - Patient's anemia is currently being monitored and transfuse PRN  - follow up labs.   - nephrology following.

## 2025-03-20 NOTE — SUBJECTIVE & OBJECTIVE
Past Medical History:   Diagnosis Date    ESRD (end stage renal disease) on dialysis     Hyperlipidemia     Hypertension     Stroke        Past Surgical History:   Procedure Laterality Date    CEREBRAL EMBOLIZATION         Review of patient's allergies indicates:  No Known Allergies    No current facility-administered medications on file prior to encounter.     Current Outpatient Medications on File Prior to Encounter   Medication Sig    aspirin (ECOTRIN) 81 MG EC tablet Take 81 mg by mouth once daily.    ATORVASTATIN CALCIUM (ATORVASTATIN ORAL) Take by mouth.    calcitRIOL (ROCALTROL) 0.5 MCG Cap Take 1 capsule by mouth once daily.    calcium carbonate (OS-ANUJ) 500 mg calcium (1,250 mg) tablet Take 1,250 mg by mouth.    NIFEdipine (PROCARDIA-XL) 60 MG (OSM) 24 hr tablet Take 1 tablet by mouth once daily.    sevelamer carbonate (RENVELA) 800 mg Tab Take 1,600 mg by mouth.    diphenhydrAMINE (BENADRYL) 25 mg capsule Take 1 each (25 mg total) by mouth every 6 (six) hours as needed for Itching or Allergies.    famotidine (PEPCID) 20 MG tablet Take 1 tablet (20 mg total) by mouth 2 (two) times daily as needed (itching).     Family History    None       Tobacco Use    Smoking status: Former    Smokeless tobacco: Not on file   Substance and Sexual Activity    Alcohol use: No    Drug use: Not on file    Sexual activity: Not on file     Review of Systems   Constitutional:  Negative for activity change, appetite change, chills and fever.   HENT:  Negative for congestion, sore throat and trouble swallowing.    Eyes:  Negative for photophobia and visual disturbance.   Respiratory:  Negative for cough, chest tightness and shortness of breath.    Cardiovascular:  Negative for chest pain, palpitations and leg swelling.   Gastrointestinal:  Negative for abdominal pain, diarrhea and nausea.   Genitourinary:  Negative for dysuria, flank pain and hematuria.   Musculoskeletal:  Negative for back pain.   Neurological:  Negative for  dizziness, weakness and headaches.   Psychiatric/Behavioral:  Negative for confusion.      Objective:     Vital Signs (Most Recent):  Temp: 97.5 °F (36.4 °C) (03/19/25 1925)  Pulse: 85 (03/19/25 2053)  Resp: 18 (03/19/25 1925)  BP: (!) 178/88 (03/19/25 1925)  SpO2: 99 % (03/19/25 1925) Vital Signs (24h Range):  Temp:  [97.5 °F (36.4 °C)-98.1 °F (36.7 °C)] 97.5 °F (36.4 °C)  Pulse:  [76-90] 85  Resp:  [16-18] 18  SpO2:  [99 %-100 %] 99 %  BP: (139-202)/() 178/88     Weight: 65.3 kg (143 lb 15.4 oz)  Body mass index is 20.08 kg/m².     Physical Exam  Vitals reviewed.   Constitutional:       Appearance: Normal appearance. He is normal weight.   HENT:      Head: Normocephalic.      Mouth/Throat:      Mouth: Mucous membranes are moist.      Pharynx: Oropharynx is clear.   Eyes:      General: Lids are normal. Gaze aligned appropriately.      Conjunctiva/sclera: Conjunctivae normal.   Cardiovascular:      Rate and Rhythm: Normal rate and regular rhythm.      Pulses: Normal pulses.      Heart sounds: Normal heart sounds.      Comments: Right upper chest dialysis catheter bleeding controlled near insertion site  Pulmonary:      Effort: Pulmonary effort is normal.      Breath sounds: Normal breath sounds.   Abdominal:      General: Bowel sounds are normal.      Palpations: Abdomen is soft.      Comments: PD catheter present   Musculoskeletal:         General: Normal range of motion.      Cervical back: Normal range of motion.   Skin:     General: Skin is warm and dry.   Neurological:      Mental Status: He is alert and oriented to person, place, and time. Mental status is at baseline.   Psychiatric:         Mood and Affect: Mood normal.                Significant Labs: All pertinent labs within the past 24 hours have been reviewed.  CBC:   Recent Labs   Lab 03/19/25  1646   WBC 7.24   HGB 9.0*   HCT 28.3*   *     CMP:   Recent Labs   Lab 03/19/25  1643      K 5.2*      CO2 13*   *   *    CREATININE 21.0*   CALCIUM 7.8*   ALBUMIN 3.0*   ANIONGAP 19*       Significant Imaging: I have reviewed all pertinent imaging results/findings within the past 24 hours.

## 2025-03-20 NOTE — ASSESSMENT & PLAN NOTE
Patient's blood pressure range in the last 24 hours was: BP  Min: 139/82  Max: 202/98.The patient's inpatient anti-hypertensive regimen is listed below:  Current Antihypertensives  , Daily, Oral  NIFEdipine 24 hr tablet 60 mg, Daily, Oral    Plan  - BP is controlled, no changes needed to their regimen  -

## 2025-03-20 NOTE — PROGRESS NOTES
[facility] Surg ([address] Medicine  Progress Note    Patient Name: Jacky Allison  MRN: [id number]  Patient Class: OP- Observation   Admission Date: 3/19/2025  Length of Stay: 0 days  Attending Physician: Suraj Sutherland MD  Primary Care Provider: No primary care provider on file.        Subjective     Principal Problem:ESRD on dialysis        HPI:  Jacky Allison is a 59 year old male with a past medical history of ESRD, hyperlipidemia, hypertension and stroke who presents today for tunneled dialysis catheter placement because he no longer wishes to do PD.  Patient is followed by , nephrology, outpatient.  Patient underwent procedure and developed uncontrolled bleeding at insertion site postoperatively.  Patient received desmopressin x2 and bleeding slowed down.  CBC showed hemoglobin 9, hematocrit 20 platelets 106.  Patient was referred to Hospital Medicine postoperatively.  Nephrology consulted with plan to dialyze in the morning.  Patient will be placed in observation for further and evaluation management    Overview/Hospital Course:  [facility] Interventional Nephrology Service   Date of Procedure:  03/19/2025 4:02 PM  Procedure: Tunneled Dialysis Central Catheter Insertion     Had bleeding post cath placement and admitted for observation.  Hgb 9.0 to 7.6.  Nephrology following.     Patient reports doing well and is anxious to go home after he completes dialysis.  Discussed with Dr. Noguera in Nephrology and will get Epo dose on HD and patient is okay to go home after dialysis.  Nephrology states his dialysis chair is set.  Discussed with patient expected courses to do well and returned to hospital if feeling bad.  All questions answered to patient's satisfaction and he is in agreement with plan.    Interval History: Pt new to me.  Doing okay and states he is ready to go home after dialysis..  Discussed with Dr. Noguera we will give patient a dose of erythropoietin on HD today, and then patient  may be discharged home after dialysis as he has a dialysis chair arranged already.    Review of Systems   Constitutional:  Negative for appetite change and fever.   Respiratory:  Negative for cough and shortness of breath.    Cardiovascular:  Negative for chest pain and palpitations.   Gastrointestinal:  Negative for abdominal pain and nausea.   Skin:  Negative for color change.   Neurological:  Negative for headaches.   Psychiatric/Behavioral:  Negative for agitation.      Objective:     Vital Signs (Most Recent):  Temp: (!) 9.4 °F (-12.6 °C) (03/20/25 0740)  Pulse: 93 (03/20/25 0740)  Resp: 18 (03/20/25 0740)  BP: (!) 161/77 (03/20/25 0740)  SpO2: 97 % (03/20/25 0740) Vital Signs (24h Range):  Temp:  [9.4 °F (-12.6 °C)-98.2 °F (36.8 °C)] 9.4 °F (-12.6 °C)  Pulse:  [76-99] 93  Resp:  [16-20] 18  SpO2:  [97 %-100 %] 97 %  BP: (139-202)/() 161/77     Weight: 65.5 kg (144 lb 6.4 oz)  Body mass index is 20.14 kg/m².    Intake/Output Summary (Last 24 hours) at 3/20/2025 0984  Last data filed at 3/20/2025 0539  Gross per 24 hour   Intake 630 ml   Output --   Net 630 ml         Physical Exam  Constitutional:       General: He is not in acute distress.     Appearance: Normal appearance. He is well-developed and normal weight.   HENT:      Head: Normocephalic and atraumatic.   Eyes:      Conjunctiva/sclera: Conjunctivae normal.      Pupils: Pupils are equal, round, and reactive to light.   Cardiovascular:      Rate and Rhythm: Normal rate and regular rhythm.      Comments: Rt IJ tunnel cath DSG intact  Pulmonary:      Effort: Pulmonary effort is normal.      Breath sounds: Normal breath sounds.   Abdominal:      General: Bowel sounds are normal.      Palpations: Abdomen is soft.      Comments: PD cath   Musculoskeletal:      Cervical back: Normal range of motion.      Right lower leg: No edema.      Left lower leg: No edema.   Skin:     General: Skin is warm and dry.   Neurological:      Mental Status: He is alert and  oriented to person, place, and time.               Significant Labs: All pertinent labs within the past 24 hours have been reviewed.  CBC:   Recent Labs   Lab 03/19/25  1646 03/20/25  0504   WBC 7.24 6.17   HGB 9.0* 7.6*   HCT 28.3* 24.4*   * 85*     CMP:   Recent Labs   Lab 03/19/25  1643 03/20/25  0504    140   K 5.2* 5.8*    108   CO2 13* 16*   * 128*   * 116*   CREATININE 21.0* 21.7*   CALCIUM 7.8* 7.4*   PROT  --  6.4   ALBUMIN 3.0* 2.7*   BILITOT  --  0.4   ALKPHOS  --  57   AST  --  17   ALT  --  19   ANIONGAP 19* 16       Significant Imaging: I have reviewed all pertinent imaging results/findings within the past 24 hours.           Assessment & Plan  ESRD on dialysis  Patient with ESRD who has been on PD and recently decided to switch to ESRD.  Tunneled catheter placed today 3/19/25, with Dr Noguera and developed significant oozing at insertion site.  Bleeding became controlled after 2 doses of desmopressin.    -nephrology consulted with plan to dialyze in the morning  -monitor CBC and consider transfusion for hemoglobin less than 7  -monitor renal function  Plan discharge home after HD completed today.  Acute blood loss anemia  Anemia is likely due to acute blood loss which was from HD cath placement . Most recent hemoglobin and hematocrit are listed below.  Recent Labs     03/19/25  1646 03/20/25  0504   HGB 9.0* 7.6*   HCT 28.3* 24.4*     - received desmopressin x2 and bleeding slowed down.   - Nephrology to give a dose of Epo with HD today and then nephrology notes he may go home.     Plan  - Monitor serial CBC: Daily  - Transfuse PRBC if patient becomes hemodynamically unstable, symptomatic or H/H drops below 7/21.  - Patient has not received any PRBC transfusions to date  - Patient's anemia is currently  being monitored and transfuse PRN  - follow up labs.   - nephrology following.   Hyperlipidemia  Noted. Currently prescribed atorvastatin daily    Continue atorvastatin  80 mg daily    Hypertension  Patient's blood pressure range in the last 24 hours was: BP  Min: 139/82  Max: 181/86.The patient's inpatient anti-hypertensive regimen is listed below:  Current Antihypertensives  , Daily, Oral  NIFEdipine 24 hr tablet 60 mg, Daily, Oral    Plan  - BP is controlled, no changes needed to their regimen  -    VTE Risk Mitigation (From admission, onward)           Ordered     IP VTE HIGH RISK PATIENT  Once         03/19/25 1711     Place sequential compression device  Until discontinued         03/19/25 1711                    Discharge Planning   MONICA: 3/20/2025     Code Status: Full Code   Medical Readiness for Discharge Date: 3/19/2025  Discharge Plan A: Home   Discharge Delays: None known at this time                    Suraj Sutherland MD  Department of Hospital Medicine   [facility]urg ([address])

## 2025-03-20 NOTE — ASSESSMENT & PLAN NOTE
Patient with ESRD who has been on PD and recently decided to switch to ESRD.  Tunneled catheter placed today 3/19/25, with Dr Noguera and developed significant oozing at insertion site.  Bleeding became controlled after 2 doses of desmopressin.    -nephrology consulted with plan to dialyze in the morning  -monitor CBC and consider transfusion for hemoglobin less than 7  -monitor renal function  Plan discharge home after HD completed today.

## 2025-03-20 NOTE — PROGRESS NOTES
03/20/25 1700   Handoff Report   Given To WILLA Brito   Treatment Type   Treatment Type Maintenance   Vital Signs   Temp 97.6 °F (36.4 °C)   Temp Source Oral   Pulse 95   Heart Rate Source Monitor   Resp 16   Device (Oxygen Therapy) room air   BP (!) 165/99   MAP (mmHg) 126   BP Location Left arm   BP Method Automatic   Patient Position Lying   Orthostatic VS No   Assessments (Pre/Post)   Blood Liters Processed (BLP) 62   Transport Modality bed   Level of Consciousness (AVPU) alert   Dialyzer Clearance clear   Pain   Pain Rating (0-10): Rest 0   Pain Rating (0-10): Activity 0   Pre-Hemodialysis Assessment   Treatment Status Completed   Tunneled Central Line - Double Lumen  03/19/25 1309 Internal Jugular Right   Placement Date/Time: 03/19/25 1309   Location: Internal Jugular Right  : JESSY  Lot Number: [id number]  Catheter Size (Fr): 14.5 Fr  Insertion attempts (enter comment if more than 2 attempts): 1  Guidewire Removed?: Yes  Guidewire Inta...   Site Assessment No redness;No swelling;No warmth   Line Securement Device Secured with sutures   Dressing Type Central line dressing   Dressing Status Old drainage   Dressing Intervention Integrity maintained   Distal Patency/Care heparin locked   Proximal 1 Patency/Care heparin locked   During Hemodialysis Assessment   Blood Flow Rate (mL/min) 350 mL/min   Dialysate Flow Rate (mL/min) 800 ml/min   Ultrafiltration Rate (mL/Hr) 500 mL/Hr   Arteriovenous Lines Secure Yes   Arterial Pressure (mmHg) -135 mmHg   Venous Pressure (mmHg) 144   Blood Volume Processed (Liters) 62.3 L   UF Removed (mL) 1500 mL   TMP 24   Venous Line in Air Detector Yes   Intake (mL) 250 mL   Intra-Hemodialysis Comments HD completed   Post-Hemodialysis Assessment   Rinseback Volume (mL) 250 mL   Blood Volume Processed (Liters) 62.3 L   Dialyzer Clearance Clear   Duration of Treatment 180 minutes   Additional Fluid Intake (mL) 500 mL   Total UF (mL) 1500 mL   Net Fluid Removal 1000    Patient Response to Treatment tolerated well   Post-Hemodialysis Comments HD completed, no complications, blood returned, CVC flushed and capped using aseptic technique,. report given to RN

## 2025-03-20 NOTE — HPI
Jacky Allison is a 59 year old male with a past medical history of ESRD, hyperlipidemia, hypertension and stroke who presents today for tunneled dialysis catheter placement because he no longer wishes to do PD.  Patient is followed by , nephrology, outpatient.  Patient underwent procedure and developed uncontrolled bleeding at insertion site postoperatively.  Patient received desmopressin x2 and bleeding slowed down.  CBC showed hemoglobin 9, hematocrit 20 platelets 106.  Patient was referred to Hospital Medicine postoperatively.  Nephrology consulted with plan to dialyze in the morning.  Patient will be placed in observation for further and evaluation management

## 2025-03-20 NOTE — PROGRESS NOTES
Pharmacist Renal Dose Adjustment Note    Jacky Allison is a 59 y.o. male being treated with the medication famotidine    Patient Data:    Vital Signs (Most Recent):  Temp: 97.5 °F (36.4 °C) (03/19/25 1925)  Pulse: 85 (03/19/25 2053)  Resp: 18 (03/19/25 1925)  BP: (!) 178/88 (03/19/25 1925)  SpO2: 99 % (03/19/25 1925) Vital Signs (72h Range):  Temp:  [97.5 °F (36.4 °C)-98.1 °F (36.7 °C)]   Pulse:  [76-90]   Resp:  [16-18]   BP: (139-202)/()   SpO2:  [99 %-100 %]      Recent Labs   Lab 03/19/25  1643   CREATININE 21.0*     Serum creatinine: 21 mg/dL (H) 03/19/25 1643  Estimated creatinine clearance: 3.6 mL/min (A)    Medication:famotidine dose: 20 mg frequency every 24 hours will be changed to medication:famotidine dose:20 mg frequency:every 48 hours after dialysis    Pharmacist's Name: Shabana Gomez  Pharmacist's Extension: [phone]

## 2025-03-20 NOTE — PLAN OF CARE
Case Management Final Discharge Note    Discharge Disposition: home    New DME ordered / company name: none    Relevant SDOH / Transition of Care Barriers:  none    Person available to provide assistance at home when needed and their contact information: Debbie Shaw (Daughter)  [phone]    Scheduled followup appointment: patient has appt with nephrologist    Referrals placed: none    Transportation: Patient daughter will transport patient home.         Patient and family educated on discharge services and updated on DC plan. Bedside RN notified, patient clear to discharge from Case Management Perspective.       [facility] ([address])  Discharge Final Note    Primary Care Provider: No primary care provider on file.    Expected Discharge Date: 3/20/2025    Final Discharge Note (most recent)       Final Note - 03/20/25 1023          Final Note    Assessment Type Final Discharge Note     Anticipated Discharge Disposition Home or Self Care     Hospital Resources/Appts/Education Provided Provided patient/caregiver with written discharge plan information;Dilaysis schedule provided        Post-Acute Status    Discharge Delays None known at this time                     Important Message from Medicare

## 2025-03-20 NOTE — PLAN OF CARE
Problem: Adult Inpatient Plan of Care  Goal: Plan of Care Review  Outcome: Met  Goal: Patient-Specific Goal (Individualized)  Outcome: Met  Goal: Absence of Hospital-Acquired Illness or Injury  Outcome: Met  Goal: Optimal Comfort and Wellbeing  Outcome: Met  Goal: Readiness for Transition of Care  Outcome: Met     Problem: Infection  Goal: Absence of Infection Signs and Symptoms  Outcome: Met     Problem: Hemodialysis  Goal: Safe, Effective Therapy Delivery  Outcome: Met  Goal: Effective Tissue Perfusion  Outcome: Met  Goal: Absence of Infection Signs and Symptoms  Outcome: Met     Adequate for Discharge

## 2025-03-20 NOTE — ASSESSMENT & PLAN NOTE
Patient's blood pressure range in the last 24 hours was: BP  Min: 139/82  Max: 181/86.The patient's inpatient anti-hypertensive regimen is listed below:  Current Antihypertensives  , Daily, Oral  NIFEdipine 24 hr tablet 60 mg, Daily, Oral    Plan  - BP is controlled, no changes needed to their regimen  -

## 2025-03-20 NOTE — H&P
[facility] Surg [address] Medicine  History & Physical    Patient Name: Jacky Allison  MRN: [id number]  Patient Class: OP- Observation  Admission Date: 3/19/2025  Attending Physician: Suraj Sutherland MD   Primary Care Provider: No primary care provider on file.         Patient information was obtained from patient, past medical records, and ER records.     Subjective:     Principal Problem:ESRD on dialysis    Chief Complaint: No chief complaint on file.       HPI: Jacky Allison is a 59 year old male with a past medical history of ESRD, hyperlipidemia, hypertension and stroke who presents today for tunneled dialysis catheter placement because he no longer wishes to do PD.  Patient is followed by , nephrology, outpatient.  Patient underwent procedure and developed uncontrolled bleeding at insertion site postoperatively.  Patient received desmopressin x2 and bleeding slowed down.  CBC showed hemoglobin 9, hematocrit 20 platelets 106.  Patient was referred to Hospital Medicine postoperatively.  Nephrology consulted with plan to dialyze in the morning.  Patient will be placed in observation for further and evaluation management    Past Medical History:   Diagnosis Date    ESRD (end stage renal disease) on dialysis     Hyperlipidemia     Hypertension     Stroke        Past Surgical History:   Procedure Laterality Date    CEREBRAL EMBOLIZATION         Review of patient's allergies indicates:  No Known Allergies    No current facility-administered medications on file prior to encounter.     Current Outpatient Medications on File Prior to Encounter   Medication Sig    aspirin (ECOTRIN) 81 MG EC tablet Take 81 mg by mouth once daily.    ATORVASTATIN CALCIUM (ATORVASTATIN ORAL) Take by mouth.    calcitRIOL (ROCALTROL) 0.5 MCG Cap Take 1 capsule by mouth once daily.    calcium carbonate (OS-ANUJ) 500 mg calcium (1,250 mg) tablet Take 1,250 mg by mouth.    NIFEdipine (PROCARDIA-XL) 60 MG (OSM) 24 hr  tablet Take 1 tablet by mouth once daily.    sevelamer carbonate (RENVELA) 800 mg Tab Take 1,600 mg by mouth.    diphenhydrAMINE (BENADRYL) 25 mg capsule Take 1 each (25 mg total) by mouth every 6 (six) hours as needed for Itching or Allergies.    famotidine (PEPCID) 20 MG tablet Take 1 tablet (20 mg total) by mouth 2 (two) times daily as needed (itching).     Family History    None       Tobacco Use    Smoking status: Former    Smokeless tobacco: Not on file   Substance and Sexual Activity    Alcohol use: No    Drug use: Not on file    Sexual activity: Not on file     Review of Systems   Constitutional:  Negative for activity change, appetite change, chills and fever.   HENT:  Negative for congestion, sore throat and trouble swallowing.    Eyes:  Negative for photophobia and visual disturbance.   Respiratory:  Negative for cough, chest tightness and shortness of breath.    Cardiovascular:  Negative for chest pain, palpitations and leg swelling.   Gastrointestinal:  Negative for abdominal pain, diarrhea and nausea.   Genitourinary:  Negative for dysuria, flank pain and hematuria.   Musculoskeletal:  Negative for back pain.   Neurological:  Negative for dizziness, weakness and headaches.   Psychiatric/Behavioral:  Negative for confusion.      Objective:     Vital Signs (Most Recent):  Temp: 97.5 °F (36.4 °C) (03/19/25 1925)  Pulse: 85 (03/19/25 2053)  Resp: 18 (03/19/25 1925)  BP: (!) 178/88 (03/19/25 1925)  SpO2: 99 % (03/19/25 1925) Vital Signs (24h Range):  Temp:  [97.5 °F (36.4 °C)-98.1 °F (36.7 °C)] 97.5 °F (36.4 °C)  Pulse:  [76-90] 85  Resp:  [16-18] 18  SpO2:  [99 %-100 %] 99 %  BP: (139-202)/() 178/88     Weight: 65.3 kg (143 lb 15.4 oz)  Body mass index is 20.08 kg/m².     Physical Exam  Vitals reviewed.   Constitutional:       Appearance: Normal appearance. He is normal weight.   HENT:      Head: Normocephalic.      Mouth/Throat:      Mouth: Mucous membranes are moist.      Pharynx: Oropharynx is  clear.   Eyes:      General: Lids are normal. Gaze aligned appropriately.      Conjunctiva/sclera: Conjunctivae normal.   Cardiovascular:      Rate and Rhythm: Normal rate and regular rhythm.      Pulses: Normal pulses.      Heart sounds: Normal heart sounds.      Comments: Right upper chest dialysis catheter bleeding controlled near insertion site  Pulmonary:      Effort: Pulmonary effort is normal.      Breath sounds: Normal breath sounds.   Abdominal:      General: Bowel sounds are normal.      Palpations: Abdomen is soft.      Comments: PD catheter present   Musculoskeletal:         General: Normal range of motion.      Cervical back: Normal range of motion.   Skin:     General: Skin is warm and dry.   Neurological:      Mental Status: He is alert and oriented to person, place, and time. Mental status is at baseline.   Psychiatric:         Mood and Affect: Mood normal.                Significant Labs: All pertinent labs within the past 24 hours have been reviewed.  CBC:   Recent Labs   Lab 03/19/25  1646   WBC 7.24   HGB 9.0*   HCT 28.3*   *     CMP:   Recent Labs   Lab 03/19/25  1643      K 5.2*      CO2 13*   *   *   CREATININE 21.0*   CALCIUM 7.8*   ALBUMIN 3.0*   ANIONGAP 19*       Significant Imaging: I have reviewed all pertinent imaging results/findings within the past 24 hours.      Assessment/Plan:     * ESRD on dialysis  Patient with ESRD who has been on PD and recently decided to switch to ESRD.  Tunneled catheter placed today 3/19/25, with Dr Noguera and developed significant oozing at insertion site.  Bleeding became controlled after 2 doses of desmopressin.    -nephrology consulted with plan to dialyze in the morning  -monitor CBC and consider transfusion for hemoglobin less than 7  -monitor renal function      Hypertension  Patient's blood pressure range in the last 24 hours was: BP  Min: 139/82  Max: 202/98.The patient's inpatient anti-hypertensive regimen is listed  below:  Current Antihypertensives  , Daily, Oral  NIFEdipine 24 hr tablet 60 mg, Daily, Oral    Plan  - BP is controlled, no changes needed to their regimen  -    Hyperlipidemia  Noted. Currently prescribed atorvastatin daily    Continue atorvastatin 80 mg daily        VTE Risk Mitigation (From admission, onward)           Ordered     IP VTE HIGH RISK PATIENT  Once         03/19/25 1711     Place sequential compression device  Until discontinued         03/19/25 1711                         On 03/19/2025, patient should be placed in hospital observation services        Teresita Le NP  Department of Hospital Medicine  [facility] Surg ([address])

## 2025-03-20 NOTE — PLAN OF CARE
Case Management Assessment     PCP: [facility] (Dr. Rao)  Pharmacy: bedside rx    Patient Arrived From: nephrology clinic  Existing Help at Home: lives alone but daughter lives next door    Barriers to Discharge: none    Discharge Plan:    A. Home    B. home      Assessment completed with patient at bedside---patient alert and oriented. Patient lives alone but daughter lives next door. Patient will be switching from PD to HD. Patient already has chair at [facility] on [address]. Patient daughter to transport patient home.       [facility] ([address])  Initial Discharge Assessment       Primary Care Provider: No primary care provider on file.    Admission Diagnosis: End stage renal disease [N18.6]  ESRD on dialysis [N18.6, Z99.2]    Admission Date: 3/19/2025  Expected Discharge Date: 3/20/2025    Transition of Care Barriers: None    Payor: [employer] / Plan: [employer] ([employer]) / Product Type: Commercial /     Extended Emergency Contact Information  Primary Emergency Contact: Debbie Shaw  Mobile Phone: [phone]  Relation: Daughter  Preferred language: English    Discharge Plan A: Home  Discharge Plan B: Home    No Pharmacies Listed    Initial Assessment (most recent)       Adult Discharge Assessment - 03/20/25 1012          Discharge Assessment    Assessment Type Discharge Planning Assessment     Confirmed/corrected address, phone number and insurance Yes     Confirmed Demographics Correct on Facesheet     Source of Information patient     Communicated MONICA with patient/caregiver Date not available/Unable to determine     People in Home alone     Do you expect to return to your current living situation? Yes     Do you have help at home or someone to help you manage your care at home? No     Prior to hospitilization cognitive status: Alert/Oriented     Current cognitive status: Alert/Oriented     Walking or Climbing Stairs Difficulty no     Dressing/Bathing  Difficulty no     Equipment Currently Used at Home none     Readmission within 30 days? No     Patient currently being followed by outpatient case management? No     Do you currently have service(s) that help you manage your care at home? No     Do you take prescription medications? Yes     Do you have prescription coverage? Yes     Coverage [employer]     Do you have any problems affording any of your prescribed medications? No     Is the patient taking medications as prescribed? yes     Who is going to help you get home at discharge? Debbie Shaw (Daughter)  [phone]     How do you get to doctors appointments? family or friend will provide     Are you on dialysis? Yes     Dialysis Name and Scheduled days [facility]) [facility]     Do you take coumadin? No     Discharge Plan A Home     Discharge Plan B Home     DME Needed Upon Discharge  none     Discharge Plan discussed with: Patient     Transition of Care Barriers None        Physical Activity    On average, how many days per week do you engage in moderate to strenuous exercise (like a brisk walk)? 0 days     On average, how many minutes do you engage in exercise at this level? 0 min        Financial Resource Strain    How hard is it for you to pay for the very basics like food, housing, medical care, and heating? Not very hard        Housing Stability    In the last 12 months, was there a time when you were not able to pay the mortgage or rent on time? No     At any time in the past 12 months, were you homeless or living in a shelter (including now)? No        Transportation Needs    Has the lack of transportation kept you from medical appointments, meetings, work or from getting things needed for daily living? No        Food Insecurity    Within the past 12 months, you worried that your food would run out before you got the money to buy more. Never true     Within the past 12 months, the food you bought just didn't last and you didn't  have money to get more. Never true        Stress    Do you feel stress - tense, restless, nervous, or anxious, or unable to sleep at night because your mind is troubled all the time - these days? Only a little        Social Isolation    How often do you feel lonely or isolated from those around you?  Never        Alcohol Use    Q1: How often do you have a drink containing alcohol? Never     Q2: How many drinks containing alcohol do you have on a typical day when you are drinking? Patient does not drink     Q3: How often do you have six or more drinks on one occasion? Never        Utilities    In the past 12 months has the electric, gas, oil, or water company threatened to shut off services in your home? No        Health Literacy    How often do you need to have someone help you when you read instructions, pamphlets, or other written material from your doctor or pharmacy? Rarely

## 2025-03-20 NOTE — DISCHARGE SUMMARY
[facility] Surg ([address] Medicine  Discharge Summary      Patient Name: Jacky Allison  MRN: [id number]  INDER: [id number]  Patient Class: OP- Observation  Admission Date: 3/19/2025  Hospital Length of Stay: 0 days  Discharge Date and Time:  03/20/2025 11:44 AM  Attending Physician: Suraj Sutherland MD   Discharging Provider: Suraj Sutherland MD  Primary Care Provider: No primary care provider on file.    Primary Care Team: Networked reference to record PCT     HPI:   Jacky Allison is a 59 year old male with a past medical history of ESRD, hyperlipidemia, hypertension and stroke who presents today for tunneled dialysis catheter placement because he no longer wishes to do PD.  Patient is followed by , nephrology, outpatient.  Patient underwent procedure and developed uncontrolled bleeding at insertion site postoperatively.  Patient received desmopressin x2 and bleeding slowed down.  CBC showed hemoglobin 9, hematocrit 20 platelets 106.  Patient was referred to Hospital Medicine postoperatively.  Nephrology consulted with plan to dialyze in the morning.  Patient will be placed in observation for further and evaluation management    Procedure(s) (LRB):  Insertion, Catheter, Central Venous, Hemodialysis (N/A)      Hospital Course:   [facility] Interventional Nephrology Service   Date of Procedure:  03/19/2025 4:02 PM  Procedure: Tunneled Dialysis Central Catheter Insertion     Had bleeding post cath placement and admitted for observation.  Hgb 9.0 to 7.6.  Nephrology following.     Patient reports doing well and is anxious to go home after he completes dialysis.  Discussed with Dr. Noguera in Nephrology and will get Epo dose on HD and patient is okay to go home after dialysis.  Nephrology states his dialysis chair is set.  We will set ASA 81 to resume in 2 days given bleeding.  Discussed with patient expected courses to do well and returned to hospital if feeling bad.  All questions answered to patient's  satisfaction and he is in agreement with plan.       Goals of Care Treatment Preferences:  Code Status: Full Code      SDOH Screening:  The patient was screened for utility difficulties, food insecurity, transport difficulties, housing insecurity, and interpersonal safety and there were no concerns identified this admission.     Consults:     Assessment & Plan  ESRD on dialysis  Patient with ESRD who has been on PD and recently decided to switch to ESRD.  Tunneled catheter placed today 3/19/25, with Dr Noguera and developed significant oozing at insertion site.  Bleeding became controlled after 2 doses of desmopressin.    -nephrology consulted with plan to dialyze in the morning  -monitor CBC and consider transfusion for hemoglobin less than 7  -monitor renal function  Plan discharge home after HD completed today.  Acute blood loss anemia  Anemia is likely due to acute blood loss which was from HD cath placement . Most recent hemoglobin and hematocrit are listed below.  Recent Labs     03/19/25  1646 03/20/25  0504   HGB 9.0* 7.6*   HCT 28.3* 24.4*     - received desmopressin x2 and bleeding slowed down.   - Nephrology to give a dose of Epo with HD today and then nephrology notes he may go home.     Plan  - Monitor serial CBC: Daily  - Transfuse PRBC if patient becomes hemodynamically unstable, symptomatic or H/H drops below 7/21.  - Patient has not received any PRBC transfusions to date  - Patient's anemia is currently  being monitored and transfuse PRN  - follow up labs.   - nephrology following.   Hyperlipidemia  Noted. Currently prescribed atorvastatin daily    Continue atorvastatin 80 mg daily    Hypertension  Patient's blood pressure range in the last 24 hours was: BP  Min: 139/82  Max: 181/86.The patient's inpatient anti-hypertensive regimen is listed below:  Current Antihypertensives  , Daily, Oral  NIFEdipine 24 hr tablet 60 mg, Daily, Oral    Plan  - BP is controlled, no changes needed to their  regimen  -    Final Active Diagnoses:    Diagnosis Date Noted POA    PRINCIPAL PROBLEM:  ESRD on dialysis [N18.6, Z99.2] 03/19/2025 Not Applicable    Acute blood loss anemia [D62] 03/20/2025 Yes    Hyperlipidemia [E78.5] 02/12/2025 Yes    Hypertension [I10] 02/12/2025 Yes      Problems Resolved During this Admission:       Discharged Condition: good    Disposition: Home or Self Care    Follow Up:   Follow-up Information       Glenn Noguera MD. Call.    Specialty: Nephrology  Contact information:  [address] [facility] [facility]  [address]  3RD FLOOR  [facility] [address]  [phone]                           Patient Instructions:      Diet renal     Lifting restrictions   Order Comments: No heavy lifting for 24 hours.     Call MD for:  temperature >100.4     Call MD for:  severe uncontrolled pain     Call MD for:  redness, tenderness, or signs of infection (pain, swelling, redness, odor or green/yellow discharge around incision site)     Call MD for:   Order Comments: Bleeding from the access site.     Notify your health care provider if you experience any of the following:  increased confusion or weakness     Notify your health care provider if you experience any of the following:  persistent dizziness, light-headedness, or visual disturbances     Notify your health care provider if you experience any of the following:  worsening rash     Notify your health care provider if you experience any of the following:  severe persistent headache     Notify your health care provider if you experience any of the following:  difficulty breathing or increased cough     Notify your health care provider if you experience any of the following:  redness, tenderness, or signs of infection (pain, swelling, redness, odor or green/yellow discharge around incision site)     Notify your health care provider if you experience any of the following:  severe uncontrolled pain     Notify your health care provider if you experience any of the following:   persistent nausea and vomiting or diarrhea     Notify your health care provider if you experience any of the following:  temperature >100.4     Activity as tolerated     Activity as tolerated       Significant Diagnostic Studies:        Pending Diagnostic Studies:       None           Medications:  Reconciled Home Medications:      Medication List        CHANGE how you take these medications      aspirin 81 MG EC tablet  Commonly known as: ECOTRIN  Take 1 tablet (81 mg total) by mouth once daily.  Start taking on: March 22, 2025  What changed: These instructions start on March 22, 2025. If you are unsure what to do until then, ask your doctor or other care provider.            CONTINUE taking these medications      ATORVASTATIN ORAL  Take 80 mg by mouth once daily.     calcitRIOL 0.5 MCG Cap  Commonly known as: ROCALTROL  Take 1 capsule by mouth once daily.     calcium carbonate 500 mg calcium (1,250 mg) tablet  Commonly known as: OS-ANUJ  Take 1,250 mg by mouth.     diphenhydrAMINE 25 mg capsule  Commonly known as: BENADRYL  Take 1 each (25 mg total) by mouth every 6 (six) hours as needed for Itching or Allergies.     famotidine 20 MG tablet  Commonly known as: PEPCID  Take 1 tablet (20 mg total) by mouth 2 (two) times daily as needed (itching).     NIFEdipine 60 MG (OSM) 24 hr tablet  Commonly known as: PROCARDIA-XL  Take 1 tablet by mouth once daily.     sevelamer carbonate 800 mg Tab  Commonly known as: RENVELA  Take 1,600 mg by mouth.              Indwelling Lines/Drains at time of discharge:   Lines/Drains/Airways       Central Venous Catheter Line  Duration             Tunneled Central Line - Double Lumen  03/19/25 1309 Internal Jugular Right <1 day                    Time spent on the discharge of patient: 25 minutes         Suraj Sutherland MD  Department of Hospital Medicine  [facility] Surg ([address])

## 2025-03-20 NOTE — HOSPITAL COURSE
[facility]   Date of Procedure:  03/19/2025 4:02 PM  Procedure: Tunneled Dialysis Central Catheter Insertion     Had bleeding post cath placement and admitted for observation.  Hgb 9.0 to 7.6.  Nephrology following.     Patient reports doing well and is anxious to go home after he completes dialysis.  Discussed with Dr. Noguera in Nephrology and will get Epo dose on HD and patient is okay to go home after dialysis.  Nephrology states his dialysis chair is set.  We will set ASA 81 to resume in 2 days given bleeding.  Discussed with patient expected courses to do well and returned to hospital if feeling bad.  All questions answered to patient's satisfaction and he is in agreement with plan.

## 2025-03-20 NOTE — SUBJECTIVE & OBJECTIVE
Interval History: Pt new to me.  Doing okay and states he is ready to go home after dialysis..  Discussed with Dr. Noguera we will give patient a dose of erythropoietin on HD today, and then patient may be discharged home after dialysis as he has a dialysis chair arranged already.    Review of Systems   Constitutional:  Negative for appetite change and fever.   Respiratory:  Negative for cough and shortness of breath.    Cardiovascular:  Negative for chest pain and palpitations.   Gastrointestinal:  Negative for abdominal pain and nausea.   Skin:  Negative for color change.   Neurological:  Negative for headaches.   Psychiatric/Behavioral:  Negative for agitation.      Objective:     Vital Signs (Most Recent):  Temp: (!) 9.4 °F (-12.6 °C) (03/20/25 0740)  Pulse: 93 (03/20/25 0740)  Resp: 18 (03/20/25 0740)  BP: (!) 161/77 (03/20/25 0740)  SpO2: 97 % (03/20/25 0740) Vital Signs (24h Range):  Temp:  [9.4 °F (-12.6 °C)-98.2 °F (36.8 °C)] 9.4 °F (-12.6 °C)  Pulse:  [76-99] 93  Resp:  [16-20] 18  SpO2:  [97 %-100 %] 97 %  BP: (139-202)/() 161/77     Weight: 65.5 kg (144 lb 6.4 oz)  Body mass index is 20.14 kg/m².    Intake/Output Summary (Last 24 hours) at 3/20/2025 0919  Last data filed at 3/20/2025 0539  Gross per 24 hour   Intake 630 ml   Output --   Net 630 ml         Physical Exam  Constitutional:       General: He is not in acute distress.     Appearance: Normal appearance. He is well-developed and normal weight.   HENT:      Head: Normocephalic and atraumatic.   Eyes:      Conjunctiva/sclera: Conjunctivae normal.      Pupils: Pupils are equal, round, and reactive to light.   Cardiovascular:      Rate and Rhythm: Normal rate and regular rhythm.      Comments: Rt IJ tunnel cath DSG intact  Pulmonary:      Effort: Pulmonary effort is normal.      Breath sounds: Normal breath sounds.   Abdominal:      General: Bowel sounds are normal.      Palpations: Abdomen is soft.      Comments: PD cath   Musculoskeletal:       Cervical back: Normal range of motion.      Right lower leg: No edema.      Left lower leg: No edema.   Skin:     General: Skin is warm and dry.   Neurological:      Mental Status: He is alert and oriented to person, place, and time.               Significant Labs: All pertinent labs within the past 24 hours have been reviewed.  CBC:   Recent Labs   Lab 03/19/25  1646 03/20/25  0504   WBC 7.24 6.17   HGB 9.0* 7.6*   HCT 28.3* 24.4*   * 85*     CMP:   Recent Labs   Lab 03/19/25  1643 03/20/25  0504    140   K 5.2* 5.8*    108   CO2 13* 16*   * 128*   * 116*   CREATININE 21.0* 21.7*   CALCIUM 7.8* 7.4*   PROT  --  6.4   ALBUMIN 3.0* 2.7*   BILITOT  --  0.4   ALKPHOS  --  57   AST  --  17   ALT  --  19   ANIONGAP 19* 16       Significant Imaging: I have reviewed all pertinent imaging results/findings within the past 24 hours.

## 2025-03-21 VITALS
TEMPERATURE: 99 F | SYSTOLIC BLOOD PRESSURE: 170 MMHG | DIASTOLIC BLOOD PRESSURE: 83 MMHG | HEART RATE: 93 BPM | HEIGHT: 71 IN | WEIGHT: 145.06 LBS | OXYGEN SATURATION: 99 % | BODY MASS INDEX: 20.31 KG/M2 | RESPIRATION RATE: 20 BRPM

## 2025-03-21 LAB
ALBUMIN SERPL BCP-MCNC: 2.8 G/DL (ref 3.5–5.2)
ALP SERPL-CCNC: 57 U/L (ref 40–150)
ALT SERPL W/O P-5'-P-CCNC: 20 U/L (ref 10–44)
ANION GAP SERPL CALC-SCNC: 10 MMOL/L (ref 8–16)
AST SERPL-CCNC: 20 U/L (ref 10–40)
BASOPHILS # BLD AUTO: 0.03 K/UL (ref 0–0.2)
BASOPHILS NFR BLD: 0.5 % (ref 0–1.9)
BILIRUB SERPL-MCNC: 0.5 MG/DL (ref 0.1–1)
BUN SERPL-MCNC: 49 MG/DL (ref 6–20)
CALCIUM SERPL-MCNC: 7.6 MG/DL (ref 8.7–10.5)
CHLORIDE SERPL-SCNC: 100 MMOL/L (ref 95–110)
CO2 SERPL-SCNC: 24 MMOL/L (ref 23–29)
CREAT SERPL-MCNC: 12 MG/DL (ref 0.5–1.4)
DIFFERENTIAL METHOD BLD: ABNORMAL
EOSINOPHIL # BLD AUTO: 0.2 K/UL (ref 0–0.5)
EOSINOPHIL NFR BLD: 2.4 % (ref 0–8)
ERYTHROCYTE [DISTWIDTH] IN BLOOD BY AUTOMATED COUNT: 16.5 % (ref 11.5–14.5)
EST. GFR  (NO RACE VARIABLE): 4 ML/MIN/1.73 M^2
GLUCOSE SERPL-MCNC: 80 MG/DL (ref 70–110)
HCT VFR BLD AUTO: 24.3 % (ref 40–54)
HGB BLD-MCNC: 8 G/DL (ref 14–18)
IMM GRANULOCYTES # BLD AUTO: 0.04 K/UL (ref 0–0.04)
IMM GRANULOCYTES NFR BLD AUTO: 0.6 % (ref 0–0.5)
LYMPHOCYTES # BLD AUTO: 1.1 K/UL (ref 1–4.8)
LYMPHOCYTES NFR BLD: 16.8 % (ref 18–48)
MCH RBC QN AUTO: 27.2 PG (ref 27–31)
MCHC RBC AUTO-ENTMCNC: 32.9 G/DL (ref 32–36)
MCV RBC AUTO: 83 FL (ref 82–98)
MONOCYTES # BLD AUTO: 0.7 K/UL (ref 0.3–1)
MONOCYTES NFR BLD: 10.1 % (ref 4–15)
NEUTROPHILS # BLD AUTO: 4.6 K/UL (ref 1.8–7.7)
NEUTROPHILS NFR BLD: 69.6 % (ref 38–73)
NRBC BLD-RTO: 0 /100 WBC
PLATELET # BLD AUTO: 86 K/UL (ref 150–450)
PMV BLD AUTO: 9.2 FL (ref 9.2–12.9)
POTASSIUM SERPL-SCNC: 4.4 MMOL/L (ref 3.5–5.1)
PROT SERPL-MCNC: 6.6 G/DL (ref 6–8.4)
RBC # BLD AUTO: 2.94 M/UL (ref 4.6–6.2)
SODIUM SERPL-SCNC: 134 MMOL/L (ref 136–145)
WBC # BLD AUTO: 6.56 K/UL (ref 3.9–12.7)

## 2025-03-21 PROCEDURE — 85025 COMPLETE CBC W/AUTO DIFF WBC: CPT | Performed by: NURSE PRACTITIONER

## 2025-03-21 PROCEDURE — 80053 COMPREHEN METABOLIC PANEL: CPT | Performed by: NURSE PRACTITIONER

## 2025-03-21 PROCEDURE — 36415 COLL VENOUS BLD VENIPUNCTURE: CPT | Performed by: NURSE PRACTITIONER

## 2025-03-21 PROCEDURE — G0378 HOSPITAL OBSERVATION PER HR: HCPCS

## (undated) DEVICE — SET MICROPUNCTUREECHO STIFF

## (undated) DEVICE — TRAY ANGIO BAPTIST

## (undated) DEVICE — GUIDEWIRE STD .035X180CM ANG

## (undated) DEVICE — DRESSING TEGADERM CHG 3.5X4.5

## (undated) DEVICE — KIT PROBE COVER WITH GEL